# Patient Record
Sex: FEMALE | Race: ASIAN | Employment: FULL TIME | URBAN - METROPOLITAN AREA
[De-identification: names, ages, dates, MRNs, and addresses within clinical notes are randomized per-mention and may not be internally consistent; named-entity substitution may affect disease eponyms.]

---

## 2020-08-04 ENCOUNTER — OFFICE VISIT (OUTPATIENT)
Dept: OBGYN CLINIC | Facility: CLINIC | Age: 50
End: 2020-08-04
Payer: COMMERCIAL

## 2020-08-04 VITALS — SYSTOLIC BLOOD PRESSURE: 127 MMHG | HEART RATE: 63 BPM | WEIGHT: 163 LBS | DIASTOLIC BLOOD PRESSURE: 82 MMHG

## 2020-08-04 DIAGNOSIS — N91.5 OLIGOMENORRHEA, UNSPECIFIED TYPE: Primary | ICD-10-CM

## 2020-08-04 DIAGNOSIS — N92.1 MENORRHAGIA WITH IRREGULAR CYCLE: ICD-10-CM

## 2020-08-04 LAB
CONTROL LINE PRESENT WITH A CLEAR BACKGROUND (YES/NO): YES YES/NO
PREGNANCY TEST, URINE: NEGATIVE

## 2020-08-04 PROCEDURE — 3074F SYST BP LT 130 MM HG: CPT | Performed by: OBSTETRICS & GYNECOLOGY

## 2020-08-04 PROCEDURE — 3079F DIAST BP 80-89 MM HG: CPT | Performed by: OBSTETRICS & GYNECOLOGY

## 2020-08-04 PROCEDURE — 99202 OFFICE O/P NEW SF 15 MIN: CPT | Performed by: OBSTETRICS & GYNECOLOGY

## 2020-08-04 PROCEDURE — 81025 URINE PREGNANCY TEST: CPT | Performed by: OBSTETRICS & GYNECOLOGY

## 2020-08-04 RX ORDER — NAPROXEN 500 MG/1
500 TABLET ORAL 2 TIMES DAILY WITH MEALS
COMMUNITY

## 2020-08-09 NOTE — PROGRESS NOTES
[facility], [facility]  Obstetrics and Gynecology  Focused Gynecology Problem Exam  Adwoa Hogan MD    Dalton Montano is a 52year old female presenting for Gyn Problem (Irregular menes for the past 3 months. New Pt)  .     HPI:   Patient presents with:  Gyn Pr resource strain: Not on file      Food insecurity:        Worry: Not on file        Inability: Not on file      Transportation needs:        Medical: Not on file        Non-medical: Not on file    Tobacco Use      Smoking status: Never Smoker      [place] ASSAY, THYROID STIM HORMONE, PROLACTIN,         DEHYDROEPIANDROSTERONE SULFATE,         TESTOSTERONE,FREE AND TOTAL    (N92.1) Menorrhagia with irregular cycle  Plan: US PELVIS (TRANSVAGINAL PELVIS) (CPT=76830),         URINE PREGNANCY TEST      PLAN:   Re

## 2020-08-11 ENCOUNTER — APPOINTMENT (OUTPATIENT)
Dept: LAB | Age: 50
End: 2020-08-11
Attending: OBSTETRICS & GYNECOLOGY
Payer: COMMERCIAL

## 2020-08-11 DIAGNOSIS — N91.5 OLIGOMENORRHEA, UNSPECIFIED TYPE: ICD-10-CM

## 2020-08-11 LAB
DHEA-S SERPL-MCNC: 160 UG/DL
PROLACTIN SERPL-MCNC: 12.7 NG/ML
TSI SER-ACNC: 1.01 MIU/ML (ref 0.36–3.74)

## 2020-08-11 PROCEDURE — 84443 ASSAY THYROID STIM HORMONE: CPT

## 2020-08-11 PROCEDURE — 84403 ASSAY OF TOTAL TESTOSTERONE: CPT

## 2020-08-11 PROCEDURE — 84146 ASSAY OF PROLACTIN: CPT

## 2020-08-11 PROCEDURE — 36415 COLL VENOUS BLD VENIPUNCTURE: CPT

## 2020-08-11 PROCEDURE — 82627 DEHYDROEPIANDROSTERONE: CPT

## 2020-08-11 PROCEDURE — 84402 ASSAY OF FREE TESTOSTERONE: CPT

## 2020-08-13 LAB
TESTOSTERONE, FREE, S: 0.34 NG/DL
TESTOSTERONE, TOTAL, S: 12 NG/DL

## 2020-08-17 ENCOUNTER — TELEPHONE (OUTPATIENT)
Dept: OBGYN CLINIC | Facility: CLINIC | Age: 50
End: 2020-08-17

## 2020-08-17 NOTE — TELEPHONE ENCOUNTER
Called Language line and used Cantonese  Pau Wesley: ID # [id number]       called and spouse picked up call. He explains that he forgot to provide his wife's number so office can call her.  He is aware that information cannot be provided to him

## 2020-08-17 NOTE — TELEPHONE ENCOUNTER
Pt called. [employer] continued to ring. No message left. Letter sent to pt. ----- Message from Chintan Mims MD sent at 8/15/2020  6:06 PM CDT -----  Result noted. Please notify patient of normal lab results.

## 2020-08-25 ENCOUNTER — TELEPHONE (OUTPATIENT)
Dept: OBGYN CLINIC | Facility: CLINIC | Age: 50
End: 2020-08-25

## 2020-08-25 NOTE — TELEPHONE ENCOUNTER
Pt   is calling on letter they received about normal test . Does pt still need to get ultrasound done?

## 2020-08-25 NOTE — TELEPHONE ENCOUNTER
Pt and  informed of results and recommendations. Pt voices understanding and the need to complete her pelvic ultrasound.

## 2020-09-08 ENCOUNTER — HOSPITAL ENCOUNTER (OUTPATIENT)
Dept: ULTRASOUND IMAGING | Age: 50
Discharge: HOME | End: 2020-09-08
Attending: OBSTETRICS & GYNECOLOGY
Payer: COMMERCIAL

## 2020-09-08 DIAGNOSIS — N92.1 MENORRHAGIA WITH IRREGULAR CYCLE: ICD-10-CM

## 2020-09-08 PROCEDURE — 76830 TRANSVAGINAL US NON-OB: CPT | Performed by: OBSTETRICS & GYNECOLOGY

## 2020-09-08 PROCEDURE — 76856 US EXAM PELVIC COMPLETE: CPT | Performed by: OBSTETRICS & GYNECOLOGY

## 2020-09-11 ENCOUNTER — TELEPHONE (OUTPATIENT)
Dept: OBGYN CLINIC | Facility: CLINIC | Age: 50
End: 2020-09-11

## 2020-09-11 NOTE — TELEPHONE ENCOUNTER
Pt called and informed of results and recommendations. Pt voices understanding, but would like to call the office back to schedule her appointment after talking to her . Eugene Mckinney   ----- Message from Hellen Ferguson MD sent at 9/9/2020  4:08 PM CDT ---

## 2020-09-29 ENCOUNTER — OFFICE VISIT (OUTPATIENT)
Dept: OBGYN CLINIC | Facility: CLINIC | Age: 50
End: 2020-09-29
Payer: COMMERCIAL

## 2020-09-29 VITALS — SYSTOLIC BLOOD PRESSURE: 119 MMHG | WEIGHT: 161 LBS | DIASTOLIC BLOOD PRESSURE: 77 MMHG | HEART RATE: 72 BPM

## 2020-09-29 DIAGNOSIS — Z12.31 SCREENING MAMMOGRAM, ENCOUNTER FOR: ICD-10-CM

## 2020-09-29 DIAGNOSIS — N95.1 PERI-MENOPAUSE: Primary | ICD-10-CM

## 2020-09-29 PROCEDURE — 3074F SYST BP LT 130 MM HG: CPT | Performed by: OBSTETRICS & GYNECOLOGY

## 2020-09-29 PROCEDURE — 3078F DIAST BP <80 MM HG: CPT | Performed by: OBSTETRICS & GYNECOLOGY

## 2020-09-29 PROCEDURE — 99213 OFFICE O/P EST LOW 20 MIN: CPT | Performed by: OBSTETRICS & GYNECOLOGY

## 2020-09-30 NOTE — PROGRESS NOTES
[facility], [facility]  Obstetrics and Gynecology  Focused Gynecology Problem Exam  Shireen Wright MD    Paul Singh is a 52year old female presenting for Follow - Up (ultrasound results )  . HPI:   Patient presents with:   Follow - Up: ultrasound res strain: Not on file      Food insecurity        Worry: Not on file        Inability: Not on file      Transportation needs        Medical: Not on file        Non-medical: Not on file    Tobacco Use      Smoking status: Never Smoker      Smokeless tobacco: Uterus Width:         4.79 cm           Endometrium Thickness:    0.72 cm       Findings:                  Ovary:                     Right Ovary Length:        1.73 cm              Right Ovary Height:         1.10 cm              Right Ovary Width: than 50% of the time was spent counseling the patient and/or coordinating care. ORDERS:   No orders of the defined types were placed in this encounter.     PRESCRIPTIONS:     Requested Prescriptions      No prescriptions requested or ordered in this enco